# Patient Record
Sex: FEMALE | Race: OTHER | Employment: STUDENT | URBAN - METROPOLITAN AREA
[De-identification: names, ages, dates, MRNs, and addresses within clinical notes are randomized per-mention and may not be internally consistent; named-entity substitution may affect disease eponyms.]

---
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
## 2025-01-06 ENCOUNTER — PATIENT MESSAGE (OUTPATIENT)
Dept: INTERNAL MEDICINE CLINIC | Facility: CLINIC | Age: 20
End: 2025-01-06

## 2025-01-06 DIAGNOSIS — F90.0 ATTENTION DEFICIT HYPERACTIVITY DISORDER (ADHD), PREDOMINANTLY INATTENTIVE TYPE: ICD-10-CM

## 2025-01-06 DIAGNOSIS — F90.1 ADHD (ATTENTION DEFICIT HYPERACTIVITY DISORDER), PREDOMINANTLY HYPERACTIVE IMPULSIVE TYPE: ICD-10-CM

## 2025-01-07 ENCOUNTER — TELEPHONE (OUTPATIENT)
Dept: INTERNAL MEDICINE CLINIC | Facility: CLINIC | Age: 20
End: 2025-01-07

## 2025-01-07 NOTE — TELEPHONE ENCOUNTER
Please see patient E-mail and advise.   See refill request from yesterday for pended medications.

## 2025-01-09 RX ORDER — DEXTROAMPHETAMINE SACCHARATE, AMPHETAMINE ASPARTATE, DEXTROAMPHETAMINE SULFATE AND AMPHETAMINE SULFATE 2.5; 2.5; 2.5; 2.5 MG/1; MG/1; MG/1; MG/1
TABLET ORAL
Qty: 30 TABLET | Refills: 0 | OUTPATIENT
Start: 2025-01-09

## 2025-01-13 RX ORDER — DEXTROAMPHETAMINE SACCHARATE, AMPHETAMINE ASPARTATE MONOHYDRATE, DEXTROAMPHETAMINE SULFATE AND AMPHETAMINE SULFATE 5; 5; 5; 5 MG/1; MG/1; MG/1; MG/1
20 CAPSULE, EXTENDED RELEASE ORAL EVERY MORNING
Qty: 30 CAPSULE | Refills: 0 | Status: SHIPPED | OUTPATIENT
Start: 2025-01-13

## 2025-02-27 ENCOUNTER — TELEPHONE (OUTPATIENT)
Dept: INTERNAL MEDICINE CLINIC | Facility: CLINIC | Age: 20
End: 2025-02-27

## 2025-02-27 DIAGNOSIS — F90.1 ADHD (ATTENTION DEFICIT HYPERACTIVITY DISORDER), PREDOMINANTLY HYPERACTIVE IMPULSIVE TYPE: ICD-10-CM

## 2025-02-27 DIAGNOSIS — F90.0 ATTENTION DEFICIT HYPERACTIVITY DISORDER (ADHD), PREDOMINANTLY INATTENTIVE TYPE: ICD-10-CM

## 2025-02-27 RX ORDER — DEXTROAMPHETAMINE SACCHARATE, AMPHETAMINE ASPARTATE, DEXTROAMPHETAMINE SULFATE AND AMPHETAMINE SULFATE 2.5; 2.5; 2.5; 2.5 MG/1; MG/1; MG/1; MG/1
TABLET ORAL
Qty: 30 TABLET | Refills: 0 | Status: SHIPPED | OUTPATIENT
Start: 2025-02-27

## 2025-02-27 RX ORDER — DEXTROAMPHETAMINE SACCHARATE, AMPHETAMINE ASPARTATE MONOHYDRATE, DEXTROAMPHETAMINE SULFATE AND AMPHETAMINE SULFATE 5; 5; 5; 5 MG/1; MG/1; MG/1; MG/1
20 CAPSULE, EXTENDED RELEASE ORAL EVERY MORNING
Qty: 30 CAPSULE | Refills: 0 | Status: SHIPPED | OUTPATIENT
Start: 2025-02-27

## 2025-02-27 NOTE — TELEPHONE ENCOUNTER
MAUREEN Sas - refills pended to print, mother asking to pick them up.     Spoke to mother Rc (on YOSEF), patient's full name and date of birth verified.  Patient is a student that attends college about 3 hours away.   Patient will be home this weekend and needs refills on both doses of Adderall.     1) Adderall ER 20 mg, patient takes one every morning.  2) Adderall 10 mg, patient takes one at noon.     Rc stated she has contacted their [facility] and they are out of stock.   Rc tried to call [facility] and she was told they will not tell her if they have Adderall in stock.     Rc is requesting printed prescriptions, if possible more than 1 month, she will come to office to pick of the prescriptions.    Best call back number for Rc is [phone].    Last telemedicine visit 12/16/24.    Recent dispenses of requested medications:  1/13/25 Adderall 20 mg ER, quantity 30   11/12/24 Adderall 10 mg tab, quantity 30

## 2025-02-27 NOTE — TELEPHONE ENCOUNTER
Pt contacted, mother Rc (ACP) ( verified ) and notified printed RX ready for . Gave verbal understanding Rc Mother of pt will  25. Location [address].

## 2025-03-21 NOTE — TELEPHONE ENCOUNTER
Refill Request    Medication request:   tiZANidine 4 MG Oral Tab       LOV:11/26/2024 Mane Alvarado DO   Due back to clinic per last office note:  N/A  NOV: Visit date not found      [place]: 11/26/2024 #7 days    Urine drug screen (if applicable): N/A  Pain contract: N/A    LOV plan (if weaning or changing medications): Refilled tizanidine as this does provide her with some relief especially at night.

## 2025-04-17 DIAGNOSIS — F90.0 ATTENTION DEFICIT HYPERACTIVITY DISORDER (ADHD), PREDOMINANTLY INATTENTIVE TYPE: ICD-10-CM

## 2025-04-17 DIAGNOSIS — F90.1 ADHD (ATTENTION DEFICIT HYPERACTIVITY DISORDER), PREDOMINANTLY HYPERACTIVE IMPULSIVE TYPE: ICD-10-CM

## 2025-04-17 RX ORDER — DEXTROAMPHETAMINE SACCHARATE, AMPHETAMINE ASPARTATE MONOHYDRATE, DEXTROAMPHETAMINE SULFATE AND AMPHETAMINE SULFATE 5; 5; 5; 5 MG/1; MG/1; MG/1; MG/1
20 CAPSULE, EXTENDED RELEASE ORAL EVERY MORNING
Qty: 30 CAPSULE | Refills: 0 | OUTPATIENT
Start: 2025-04-17

## 2025-04-17 RX ORDER — DEXTROAMPHETAMINE SACCHARATE, AMPHETAMINE ASPARTATE, DEXTROAMPHETAMINE SULFATE AND AMPHETAMINE SULFATE 2.5; 2.5; 2.5; 2.5 MG/1; MG/1; MG/1; MG/1
TABLET ORAL
Qty: 30 TABLET | Refills: 0 | OUTPATIENT
Start: 2025-04-17

## 2025-04-17 NOTE — TELEPHONE ENCOUNTER
Please review; protocol failed/ has no protocol      Liana Montano, RN6 minutes ago (10:20 AM)     AR  Patient's mother Rc encarnacion,verified name and date of birth.  She has release of information permission.  Mom is requesting refill of generic Aderall 10 mg take one daily at noon   and generic Adderall  XR 20 mg take one capsule every morning.  Patient is out of medication.  Medication  pended to run through protocol. Pharmacy verified.               Fill dates for Adderall 10 mg     Recent fills: 02/272025,12/09/2024  Last Rx written: 02/27/2025  Last Office Visit: 07/25/2024    Recent Visits  Date Type Provider Dept   07/25/24 Office Visit Neda Cedeño APRN [facility] Med[facility]       Fill dates for Adderall ER 20 MG    Recent fills: 02/28/2025,01/13/2025,12/16/2024  Last Rx written: 02/28/2025  Last Office Visit: 07/25/2024    Recent Visits  Date Type Provider Dept   07/25/24 Office Visit Neda Cedeño APRN [facility]

## 2025-04-17 NOTE — TELEPHONE ENCOUNTER
Patient's mother Rc encarnacion,verified name and date of birth.  She has release of information permission.  Mom is requesting refill of generic Aderall 10 mg take one daily at noon   and generic Adderall  XR 20 mg take one capsule every morning.  Patient is out of medication.  Medication  pended to run through protocol. Pharmacy verified.

## 2025-04-23 NOTE — TELEPHONE ENCOUNTER
Mother calling for status update. Patient is completely out of medication and has not taken medication for a week.

## 2025-04-25 RX ORDER — DEXTROAMPHETAMINE SACCHARATE, AMPHETAMINE ASPARTATE MONOHYDRATE, DEXTROAMPHETAMINE SULFATE AND AMPHETAMINE SULFATE 5; 5; 5; 5 MG/1; MG/1; MG/1; MG/1
20 CAPSULE, EXTENDED RELEASE ORAL EVERY MORNING
Qty: 30 CAPSULE | Refills: 0 | Status: SHIPPED | OUTPATIENT
Start: 2025-04-25

## 2025-04-25 RX ORDER — DEXTROAMPHETAMINE SACCHARATE, AMPHETAMINE ASPARTATE, DEXTROAMPHETAMINE SULFATE AND AMPHETAMINE SULFATE 2.5; 2.5; 2.5; 2.5 MG/1; MG/1; MG/1; MG/1
TABLET ORAL
Qty: 30 TABLET | Refills: 0 | Status: SHIPPED | OUTPATIENT
Start: 2025-04-25

## 2025-06-02 DIAGNOSIS — F90.0 ATTENTION DEFICIT HYPERACTIVITY DISORDER (ADHD), PREDOMINANTLY INATTENTIVE TYPE: ICD-10-CM

## 2025-06-02 RX ORDER — DEXTROAMPHETAMINE SACCHARATE, AMPHETAMINE ASPARTATE MONOHYDRATE, DEXTROAMPHETAMINE SULFATE AND AMPHETAMINE SULFATE 5; 5; 5; 5 MG/1; MG/1; MG/1; MG/1
20 CAPSULE, EXTENDED RELEASE ORAL EVERY MORNING
Qty: 30 CAPSULE | Refills: 0 | Status: SHIPPED | OUTPATIENT
Start: 2025-06-02

## 2025-06-02 RX ORDER — DEXTROAMPHETAMINE SACCHARATE, AMPHETAMINE ASPARTATE, DEXTROAMPHETAMINE SULFATE AND AMPHETAMINE SULFATE 2.5; 2.5; 2.5; 2.5 MG/1; MG/1; MG/1; MG/1
TABLET ORAL
Qty: 30 TABLET | Refills: 0 | Status: SHIPPED | OUTPATIENT
Start: 2025-06-02

## 2025-06-02 NOTE — TELEPHONE ENCOUNTER
Please review. Protocol Failed; No Protocol      Recent fills: 4/25/2025  Last Rx written: 4/25/2025  Last office visit: 7/25/2024    Future Appointments  Date Type Provider Dept   07/22/25 Appointment Ijeoma Alexander MD [facility]-Internal Med[facility]   Showing future appointments within next 150 days with a meds authorizing provider and meeting all other requirements

## 2025-06-02 NOTE — TELEPHONE ENCOUNTER
Spoke with patient's mother per YOSEF, Date of Birth verified.  She is looking for pt med refill, see below pended rx.  She will need rx ref as pt will be going back to College this Wednesday.   Also warm transferred to [facility] staff for follow up appt.   Routed to [facility] to run for protocol , thanks.             Requested Prescriptions     Pending Prescriptions Disp Refills    Amphetamine-Dextroamphet ER (ADDERALL XR) 20 MG Oral Capsule SR 24 Hr 30 capsule 0     Sig: Take 1 capsule (20 mg total) by mouth every morning.    amphetamine-dextroamphetamine (ADDERALL) 10 MG Oral Tab 30 tablet 0     Si tablet p.o. at noon       Last Office Visit with PCP: 2024

## 2025-06-25 ENCOUNTER — TELEPHONE (OUTPATIENT)
Dept: INTERNAL MEDICINE CLINIC | Facility: CLINIC | Age: 20
End: 2025-06-25

## 2025-06-25 DIAGNOSIS — F90.1 ADHD (ATTENTION DEFICIT HYPERACTIVITY DISORDER), PREDOMINANTLY HYPERACTIVE IMPULSIVE TYPE: ICD-10-CM

## 2025-06-25 DIAGNOSIS — F90.0 ATTENTION DEFICIT HYPERACTIVITY DISORDER (ADHD), PREDOMINANTLY INATTENTIVE TYPE: ICD-10-CM

## 2025-06-29 ENCOUNTER — TELEPHONE (OUTPATIENT)
Dept: INTERNAL MEDICINE CLINIC | Facility: CLINIC | Age: 20
End: 2025-06-29

## 2025-06-29 DIAGNOSIS — F90.1 ADHD (ATTENTION DEFICIT HYPERACTIVITY DISORDER), PREDOMINANTLY HYPERACTIVE IMPULSIVE TYPE: ICD-10-CM

## 2025-06-29 DIAGNOSIS — F90.0 ATTENTION DEFICIT HYPERACTIVITY DISORDER (ADHD), PREDOMINANTLY INATTENTIVE TYPE: ICD-10-CM

## 2025-06-29 RX ORDER — DEXTROAMPHETAMINE SACCHARATE, AMPHETAMINE ASPARTATE, DEXTROAMPHETAMINE SULFATE AND AMPHETAMINE SULFATE 2.5; 2.5; 2.5; 2.5 MG/1; MG/1; MG/1; MG/1
TABLET ORAL
Qty: 30 TABLET | Refills: 0 | Status: SHIPPED | OUTPATIENT
Start: 2025-06-29

## 2025-06-29 RX ORDER — DEXTROAMPHETAMINE SACCHARATE, AMPHETAMINE ASPARTATE MONOHYDRATE, DEXTROAMPHETAMINE SULFATE AND AMPHETAMINE SULFATE 5; 5; 5; 5 MG/1; MG/1; MG/1; MG/1
20 CAPSULE, EXTENDED RELEASE ORAL EVERY MORNING
Qty: 30 CAPSULE | Refills: 0 | Status: SHIPPED | OUTPATIENT
Start: 2025-06-29

## 2025-08-12 ENCOUNTER — LAB ENCOUNTER (OUTPATIENT)
Dept: LAB | Age: 20
End: 2025-08-12
Attending: NURSE PRACTITIONER

## 2025-08-12 ENCOUNTER — OFFICE VISIT (OUTPATIENT)
Dept: INTERNAL MEDICINE CLINIC | Facility: CLINIC | Age: 20
End: 2025-08-12

## 2025-08-12 VITALS
WEIGHT: 138 LBS | HEART RATE: 54 BPM | SYSTOLIC BLOOD PRESSURE: 100 MMHG | BODY MASS INDEX: 22 KG/M2 | DIASTOLIC BLOOD PRESSURE: 66 MMHG

## 2025-08-12 DIAGNOSIS — G44.209 TENSION HEADACHE: ICD-10-CM

## 2025-08-12 DIAGNOSIS — Z00.00 WELLNESS EXAMINATION: Primary | ICD-10-CM

## 2025-08-12 DIAGNOSIS — Z13.0 SCREENING FOR DEFICIENCY ANEMIA: ICD-10-CM

## 2025-08-12 DIAGNOSIS — H53.9 VISION DISORDER: ICD-10-CM

## 2025-08-12 DIAGNOSIS — Z13.220 LIPID SCREENING: ICD-10-CM

## 2025-08-12 DIAGNOSIS — Z13.29 THYROID DISORDER SCREEN: ICD-10-CM

## 2025-08-12 DIAGNOSIS — F90.0 ATTENTION DEFICIT HYPERACTIVITY DISORDER (ADHD), PREDOMINANTLY INATTENTIVE TYPE: ICD-10-CM

## 2025-08-12 DIAGNOSIS — Z13.21 ENCOUNTER FOR VITAMIN DEFICIENCY SCREENING: ICD-10-CM

## 2025-08-12 LAB
ALBUMIN SERPL-MCNC: 4.8 G/DL (ref 3.2–4.8)
ALBUMIN/GLOB SERPL: 1.8 (ref 1–2)
ALP LIVER SERPL-CCNC: 60 U/L (ref 52–144)
ALT SERPL-CCNC: 33 U/L (ref 10–49)
ANION GAP SERPL CALC-SCNC: 7 MMOL/L (ref 0–18)
AST SERPL-CCNC: 21 U/L (ref ?–34)
BASOPHILS # BLD AUTO: 0.04 X10(3) UL (ref 0–0.2)
BASOPHILS NFR BLD AUTO: 0.7 %
BILIRUB SERPL-MCNC: 0.6 MG/DL (ref 0.3–1.2)
BUN BLD-MCNC: 10 MG/DL (ref 9–23)
BUN/CREAT SERPL: 12.5 (ref 10–20)
CALCIUM BLD-MCNC: 9.8 MG/DL (ref 8.7–10.4)
CHLORIDE SERPL-SCNC: 105 MMOL/L (ref 98–112)
CHOLEST SERPL-MCNC: 174 MG/DL (ref ?–200)
CO2 SERPL-SCNC: 27 MMOL/L (ref 21–32)
CREAT BLD-MCNC: 0.8 MG/DL (ref 0.55–1.02)
DEPRECATED HBV CORE AB SER IA-ACNC: 13 NG/ML (ref 50–306)
DEPRECATED RDW RBC AUTO: 38.5 FL (ref 35.1–46.3)
EGFRCR SERPLBLD CKD-EPI 2021: 108 ML/MIN/1.73M2 (ref 60–?)
EOSINOPHIL # BLD AUTO: 0.18 X10(3) UL (ref 0–0.7)
EOSINOPHIL NFR BLD AUTO: 3.1 %
ERYTHROCYTE [DISTWIDTH] IN BLOOD BY AUTOMATED COUNT: 13.5 % (ref 11–15)
FASTING PATIENT LIPID ANSWER: NO
FASTING STATUS PATIENT QL REPORTED: NO
GLOBULIN PLAS-MCNC: 2.7 G/DL (ref 2–3.5)
GLUCOSE BLD-MCNC: 92 MG/DL (ref 70–99)
HCT VFR BLD AUTO: 37.9 % (ref 35–48)
HDLC SERPL-MCNC: 72 MG/DL (ref 40–59)
HGB BLD-MCNC: 11.4 G/DL (ref 12–16)
IMM GRANULOCYTES # BLD AUTO: 0.01 X10(3) UL (ref 0–1)
IMM GRANULOCYTES NFR BLD: 0.2 %
IRON SATN MFR SERPL: 19 % (ref 15–50)
IRON SERPL-MCNC: 77 UG/DL (ref 50–170)
LDLC SERPL CALC-MCNC: 91 MG/DL (ref ?–100)
LYMPHOCYTES # BLD AUTO: 2.09 X10(3) UL (ref 1–4)
LYMPHOCYTES NFR BLD AUTO: 36.5 %
MCH RBC QN AUTO: 23.7 PG (ref 26–34)
MCHC RBC AUTO-ENTMCNC: 30.1 G/DL (ref 31–37)
MCV RBC AUTO: 78.6 FL (ref 80–100)
MONOCYTES # BLD AUTO: 0.6 X10(3) UL (ref 0.1–1)
MONOCYTES NFR BLD AUTO: 10.5 %
NEUTROPHILS # BLD AUTO: 2.81 X10 (3) UL (ref 1.5–7.7)
NEUTROPHILS # BLD AUTO: 2.81 X10(3) UL (ref 1.5–7.7)
NEUTROPHILS NFR BLD AUTO: 49 %
NONHDLC SERPL-MCNC: 102 MG/DL (ref ?–130)
OSMOLALITY SERPL CALC.SUM OF ELEC: 287 MOSM/KG (ref 275–295)
PLATELET # BLD AUTO: 292 10(3)UL (ref 150–450)
POTASSIUM SERPL-SCNC: 4.1 MMOL/L (ref 3.5–5.1)
PROT SERPL-MCNC: 7.5 G/DL (ref 5.7–8.2)
RBC # BLD AUTO: 4.82 X10(6)UL (ref 3.8–5.3)
SODIUM SERPL-SCNC: 139 MMOL/L (ref 136–145)
TOTAL IRON BINDING CAPACITY: 416 UG/DL (ref 250–425)
TRANSFERRIN SERPL-MCNC: 320 MG/DL (ref 250–380)
TRIGL SERPL-MCNC: 53 MG/DL (ref 30–149)
TSI SER-ACNC: 0.66 UIU/ML (ref 0.55–4.78)
VIT D+METAB SERPL-MCNC: 36.1 NG/ML (ref 30–100)
VLDLC SERPL CALC-MCNC: 9 MG/DL (ref 0–30)
WBC # BLD AUTO: 5.7 X10(3) UL (ref 4–11)

## 2025-08-12 PROCEDURE — 84466 ASSAY OF TRANSFERRIN: CPT | Performed by: NURSE PRACTITIONER

## 2025-08-12 PROCEDURE — 80061 LIPID PANEL: CPT | Performed by: NURSE PRACTITIONER

## 2025-08-12 PROCEDURE — 85025 COMPLETE CBC W/AUTO DIFF WBC: CPT | Performed by: NURSE PRACTITIONER

## 2025-08-12 PROCEDURE — 36415 COLL VENOUS BLD VENIPUNCTURE: CPT | Performed by: NURSE PRACTITIONER

## 2025-08-12 PROCEDURE — 82728 ASSAY OF FERRITIN: CPT | Performed by: NURSE PRACTITIONER

## 2025-08-12 PROCEDURE — 84443 ASSAY THYROID STIM HORMONE: CPT | Performed by: NURSE PRACTITIONER

## 2025-08-12 PROCEDURE — 83540 ASSAY OF IRON: CPT | Performed by: NURSE PRACTITIONER

## 2025-08-12 PROCEDURE — 82306 VITAMIN D 25 HYDROXY: CPT | Performed by: NURSE PRACTITIONER

## 2025-08-12 PROCEDURE — 80053 COMPREHEN METABOLIC PANEL: CPT | Performed by: NURSE PRACTITIONER

## 2025-08-12 RX ORDER — DEXTROAMPHETAMINE SACCHARATE, AMPHETAMINE ASPARTATE MONOHYDRATE, DEXTROAMPHETAMINE SULFATE AND AMPHETAMINE SULFATE 5; 5; 5; 5 MG/1; MG/1; MG/1; MG/1
20 CAPSULE, EXTENDED RELEASE ORAL EVERY MORNING
Qty: 30 CAPSULE | Refills: 0 | Status: SHIPPED | OUTPATIENT
Start: 2025-08-12

## 2025-08-12 RX ORDER — DEXTROAMPHETAMINE SACCHARATE, AMPHETAMINE ASPARTATE, DEXTROAMPHETAMINE SULFATE AND AMPHETAMINE SULFATE 2.5; 2.5; 2.5; 2.5 MG/1; MG/1; MG/1; MG/1
TABLET ORAL
Qty: 30 TABLET | Refills: 0 | Status: SHIPPED | OUTPATIENT
Start: 2025-08-12

## (undated) DEVICE — LOWER EXTREMITY: Brand: MEDLINE INDUSTRIES, INC.

## (undated) DEVICE — AMBIENT SUPER TURBOVAC 90 IFS: Brand: COBLATION

## (undated) DEVICE — POLAR CARE CUBE COOLING UNIT

## (undated) DEVICE — DRAPE SHEET LG

## (undated) DEVICE — Device

## (undated) DEVICE — ABDOMINAL PAD: Brand: CURITY

## (undated) DEVICE — SOL  .9 3000ML

## (undated) DEVICE — NEEDLE HPO 18GA 1.5IN ECLPS

## (undated) DEVICE — TOWEL OR BLU 16X26 STRL

## (undated) DEVICE — 3M™ STERI-STRIP™ REINFORCED ADHESIVE SKIN CLOSURES, R1547, 1/2 IN X 4 IN (12 MM X 100 MM), 6 STRIPS/ENVELOPE: Brand: 3M™ STERI-STRIP™

## (undated) DEVICE — SPINOCAN® 18 GA. X 3-1/2 IN. (90 MM) SPINAL NEEDLE: Brand: SPINOCAN®

## (undated) DEVICE — ZIMMER® STERILE DISPOSABLE TOURNIQUET CUFF WITH PLC, DUAL PORT, SINGLE BLADDER, 30 IN. (76 CM)

## (undated) DEVICE — DRAPE SRG 90X60IN BCK TBL CVR

## (undated) DEVICE — PAD THRP 16IN WRPON MU LNG STM

## (undated) DEVICE — GOWN SURG AERO BLUE PERF XLG

## (undated) DEVICE — SUTURE PDS II 4-0 PS-2

## (undated) DEVICE — COTTON UNDERCAST PADDING,REGULAR FINISH: Brand: WEBRIL

## (undated) DEVICE — SUTURE NDL MAYO 4 .5 CRC TROC

## (undated) DEVICE — VIOLET BRAIDED (POLYGLACTIN 910), SYNTHETIC ABSORBABLE SUTURE: Brand: COATED VICRYL

## (undated) DEVICE — GAUZE SPONGES,12 PLY: Brand: CURITY

## (undated) DEVICE — KNEE IMMOBILIZER: Brand: DEROYAL

## (undated) DEVICE — SUCTION CANISTER, 3000CC,SAFELINER: Brand: DEROYAL

## (undated) DEVICE — BURR SHVR COOLCUT 13CM 4MM 8

## (undated) DEVICE — BATTERY

## (undated) DEVICE — ADHESIVE MASTISOL 2/3CC VL

## (undated) DEVICE — CHLORAPREP 26ML APPLICATOR

## (undated) DEVICE — WEBRIL COTTON UNDERCAST PADDING: Brand: WEBRIL

## (undated) DEVICE — TUBING SET, GRAVITY, 4-SPIKE

## (undated) DEVICE — DRAPE SRG 70X60IN SPLT U IMPRV

## (undated) DEVICE — ENCORE® LATEX ACCLAIM SIZE 8, STERILE LATEX POWDER-FREE SURGICAL GLOVE: Brand: ENCORE

## (undated) DEVICE — SUTURE VICRYL 2-0 FS-1

## (undated) NOTE — LETTER
VACCINE ADMINISTRATION RECORD  PARENT / GUARDIAN APPROVAL  Date: 3/22/2023  Vaccine administered to: Trey Merrill     : 2005    MRN: [id number]    A copy of the appropriate Centers for Disease Control and Prevention Vaccine Information statement has been provided. I have read or have had explained the information about the diseases and the vaccines listed below. There was an opportunity to ask questions and any questions were answered satisfactorily. I believe that I understand the benefits and risks of the vaccine cited and ask that the vaccine(s) listed below be given to me or to the person named above (for whom I am authorized to make this request). VACCINES ADMINISTERED:  Men B    I have read and hereby agree to be bound by the terms of this agreement as stated above. My signature is valid until revoked by me in writing. This document is signed by relationship: Parents on 3/22/2023.:                                                                                                                                         Parent / Radha Mae Signature                                                Date    Brokolyn Cherry RN served as a witness to authentication that the identity of the person signing electronically is in fact the person represented as signing.

## (undated) NOTE — LETTER
2/8/2018              Hima Little        [facility] [address]             Immunization History   Administered Date(s) Administered   • Meningococcal Albina Mckenzie) 05/16/2016           Sincerely,      Jaylon King MD  [facility]

## (undated) NOTE — LETTER
2/21/2023              Michelle [address] [place] [address]         To whom it may concern,    Shannan Melendrez was seen at our office today for a well check. Please excuse her from school she may return back 2/22/23. If you have any questions or concerns give our office a call. Thank you!           Sincerely,       Vi Woods MD  [facility], [address]  [phone]

## (undated) NOTE — LETTER
VACCINE ADMINISTRATION RECORD  PARENT / GUARDIAN APPROVAL  Date: 2023  Vaccine administered to: Mariposa Trent     : 2005    MRN: [id number]    A copy of the appropriate Centers for Disease Control and Prevention Vaccine Information statement has been provided. I have read or have had explained the information about the diseases and the vaccines listed below. There was an opportunity to ask questions and any questions were answered satisfactorily. I believe that I understand the benefits and risks of the vaccine cited and ask that the vaccine(s) listed below be given to me or to the person named above (for whom I am authorized to make this request). VACCINES ADMINISTERED:  Bexsero    I have read and hereby agree to be bound by the terms of this agreement as stated above. My signature is valid until revoked by me in writing. This document is signed by  , relationship: Parents on 2023.:                                                                                                   2023                                 Parent / Per Dubonen                                                Date    Clark Carrizales RN served as a witness to authentication that the identity of the person signing electronically is in fact the person represented as signing. This document was generated by Clark Carrizales RN on 2023.

## (undated) NOTE — LETTER
10/5/2021              Michelle [address] [place] [address]         To Whom It May Concern,      Please excuse Clovis Jones from school today, was seen in my office today for a well visit. May return to school tomorrow.   If you hav

## (undated) NOTE — LETTER
9/25/2020              Michelle Jaquez       Immunization History   Administered Date(s) Administered   • DTAP-IPV 09/07/2010   • DTAP/HEP B/IPV Combined 07/26/2005, 09/19/2005, 11/28/2005, 11/20/2006   • FLUL

## (undated) NOTE — LETTER
11/6/2020              [address] [address]         To Whom it May Concern,    Loulou Pedroza is a patient of mine and was seen in my practice today. Please excuse her from school for 11/6/2020.      She may return to s

## (undated) NOTE — LETTER
10/17/2022              Michelle Harris        [address] [facility] [address] [address]         To whom it may concern,  Reza Taylor needs new orthotics. Karlos and Duane please evaluate and treat for custom made Orthotics. She has tendinitis of the ankle.       Sincerely,       MD Opal Banuelos , [address], [address] [place] [address]  [phone]        Document electronically generated by:  Lorraine Wright MD

## (undated) NOTE — MR AVS SNAPSHOT
[place]  [address]  [phone]               Thank you for choosing us for your health care visit with Trisha Hanna.  Lena Hannah MD.  We are glad to serve you and happy to provide you with this summ acquired. Please contact the Patient Business Office at [phone] if you have any questions related to insurance coverage. Thank you.          Referral Details     Referred By    Referred To    MD [address]   [address]   [facility] 110/64 mmHg 61.145 kg (134 lb 12.8 oz) (95 %*, Z = 1.66)        *Growth percentiles are based on CDC 2-20 Years data         Current Medications      Notice  As of 4/26/2017  8:46 PM    You have not been prescribed any medications.             Antonit

## (undated) NOTE — LETTER
Date & Time: 11/27/2022, 3:45 PM  Patient: Tommy Gutierrez  Encounter Provider(s):    MAUREEN Echevarria       To Whom It May Concern:    Tommy Gutierrez was seen and treated in our department on 11/27/2022. She should not return to school until 11/29/2022, as long has she has been fever free for 24 hours prior. .    If you have any questions or concerns, please do not hesitate to call.        _____________________________  Physician/APC Signature

## (undated) NOTE — LETTER
[facility] of Child Health Examination       Student's Name  Marlon Acuña Birth Date professional) verifying above immunization history must sign below.   Signature                      Title                           Date  10/05/21   Signature HEALTH HISTORY          TO BE COMPLETED AND SIGNED BY PARENT/GUARDIAN AND VERIFIED BY HEALTH CARE PROVIDER    ALLERGIES  (Food, drug, insect, other)  Patient has no known allergies.  MEDICATION  (List all prescribed or taken on a regular basis.)      •  A if <33 years old):   BP 99/67   Pulse 69   Ht 5' 5.75\"   Wt 56.7 kg (125 lb)   BMI 20.33 kg/m²     DIABETES SCREENING  BMI>85% age/sex  No And any two of the following:  Family History No    Ethnic Minority  No          Signs of Insulin Resistance (hyper Yes        Currently Prescribed Asthma Medication:            Quick-relief  medication (e.g. Short Acting Beta Antagonist): No          Controller medication (e.g. inhaled corticosteroid):   No Other   NEEDS/MODIFICATIONS required in the school setting  No

## (undated) NOTE — LETTER
10/2/2019              Michelle [facility] [address]         To Whom It May Concern,    Hima Boyd was in my office today due to illness and fever.   Please excuse her from school Tues-Thurs and return to school Friday if sh

## (undated) NOTE — LETTER
VACCINE ADMINISTRATION RECORD  PARENT / GUARDIAN APPROVAL  Date: 2020  Vaccine administered to: Brayan Otto     : 2005    MRN: [id number]    A copy of the appropriate Centers for Disease Control and Prevention Vaccine Information statement ha

## (undated) NOTE — LETTER
VACCINE ADMINISTRATION RECORD  PARENT / GUARDIAN APPROVAL  Date: 2020  Vaccine administered to: Daniel Santillan     : 2005    MRN: [id number]    A copy of the appropriate Centers for Disease Control and Prevention Vaccine Information statement has

## (undated) NOTE — MR AVS SNAPSHOT
[address]  [address]  [phone]               Thank you for choosing us for your health care visit with Chance Sanchez DPM.  We are glad to serve you and happy to provide yo Visit [facility] online at  [email or web address]

## (undated) NOTE — LETTER
3/22/2023              Michelle Steven Jaquez         To Whom it may concern:      Please excuse Michelle from school early today for appt at 56 for vaccination at [address] [place] [address] and if need be, please allow Michelle late arrival on 3/23/2023 in she has ill effects from the vaccination. If we can be of further assistance, please call our office.        Sincerely,        DO JELENA [facility], [facility] [id number] [phone]

## (undated) NOTE — LETTER
[facility] [employer] of Child Health Examination       Student's Name  Wisam Bradshaw Birth Date Signature              ***                                                                                                                     Title      MD                     Date  8/6/2020   Signature Female School   Grade Level/ID#  10th Grade   HEALTH HISTORY          TO BE COMPLETED AND SIGNED BY PARENT/GUARDIAN AND VERIFIED BY HEALTH CARE PROVIDER    ALLERGIES  (Food, drug, insect, other)  Patient has no known allergies.  MEDICATION  (List all prescr /67   Pulse 83   Ht 5' 5.25\" (1.657 m)   Wt 62.7 kg (138 lb 3.2 oz)   LMP 06/03/2020   BMI 22.82 kg/m²     DIABETES SCREENING  BMI>85% age/sex  No And any two of the following:  Family History Yes    Ethnic Minority  Yes          Signs of Insulin Re Respiratory Yes                   Diagnosis of Asthma: No Mental Health Yes        Currently Prescribed Asthma Medication:            Quick-relief  medication (e.g. Short Acting Beta Antagonist): No          Controller medication (e.g. inhaled corticostero

## (undated) NOTE — LETTER
10/25/2019              [address] [address]         To whom it may concern,  Magda Wise out grew her orthotics and needs a new pain. Please evaluate and treat, for custom made.   Sincerely,    Desiree Encinas APRJD  [facility]

## (undated) NOTE — LETTER
Date: 4/26/2021    Patient Name: Myla Monroy          To Whom it may concern: This letter has been written at the patient's request. The above patient was seen at one of the [facility] for treatment of a medical condition.     Clint Mc

## (undated) NOTE — LETTER
6/4/2020              Michelle Jaquez         To Whom It May Concern,    Please be advised that Froilan Lew has an appointment with Dr. Nina Ardon an orthopedic specialist tomorrow morning at 8:30 am. Please excus

## (undated) NOTE — LETTER
[facility] [employer] of Child Health Examination       Student's Name  Marlon Acuña Birth Date Signature  ***     Title    MD    Date  9/25/2020   Signature                                                                                                                                              Title                           Date    (If adding da HEALTH HISTORY          TO BE COMPLETED AND SIGNED BY PARENT/GUARDIAN AND VERIFIED BY HEALTH CARE PROVIDER    ALLERGIES  (Food, drug, insect, other)  Patient has no known allergies.  MEDICATION  (List all prescribed or taken on a regular basis.)  No current /70   Pulse 59   Ht 5' 5.25\" (1.657 m)   Wt 63.5 kg (140 lb)   LMP 06/06/2020 (Exact Date)   BMI 23.12 kg/m²     DIABETES SCREENING  BMI>85% age/sex  No And any two of the following:  Family History No    Ethnic Minority  No          Signs of Insuli Respiratory Yes                   Diagnosis of Asthma: No Mental Health Yes        Currently Prescribed Asthma Medication:            Quick-relief  medication (e.g. Short Acting Beta Antagonist): No          Controller medication (e.g. inhaled corticostero

## (undated) NOTE — LETTER
[facility] [employer] of Child Health Examination       Student's Name  Dee Rasmussen Birth Date Signature                                                                                                                                   Title                           Date     Signature Grade Level/ID#  9th Grade   HEALTH HISTORY          TO BE COMPLETED AND SIGNED BY PARENT/GUARDIAN AND VERIFIED BY HEALTH CARE PROVIDER    ALLERGIES  (Food, drug, insect, other) MEDICATION  (List all prescribed or taken on a regular basis.)     Diagnosis /72   Ht 5' 5\" (1.651 m)   Wt 68.5 kg (151 lb)   BMI 25.13 kg/m²     DIABETES SCREENING  BMI>85% age/sex  No And any two of the following:  Family History Yes   Ethnic Minority  No          Signs of Insulin Resistance (hypertension, dyslipidemia, po Currently Prescribed Asthma Medication:            Quick-relief  medication (e.g. Short Acting Beta Antagonist): No          Controller medication (e.g. inhaled corticosteroid):   No Other   NEEDS/MODIFICATIONS required in the school setting  None DIET

## (undated) NOTE — LETTER
10/25/2019              Michelle [address] [place] [id number]         To whom it may concern,  Cooper Franny out grew her orthotics and needs a new pair. Karlos and Duane please evaluate and treat for custom made  Orthotics.     Sincerely,  Jory Hernandez

## (undated) NOTE — LETTER
2/25/2020              Michelle [address] [place] [address]         To Whom it may concern:     This is to certify that Deandre Ramos had an appointment on 2/25/2020 at with Susanne Mckay MD.  Please excuse Michelle from school due to [facility]

## (undated) NOTE — MR AVS SNAPSHOT
[place]  [address]  [phone]               Thank you for choosing us for your health care visit with [facility], .   We are glad to serve you and happy to provide you with this summary of Call [phone] for help. MyChart is NOT to be used for urgent needs. For medical emergencies, dial 911.             Educational Information     Healthy Active Living  An initiative of the American Academy of Pediatrics    Fact Sheet: Healthy Active Help your children form healthy habits. Healthy active children are more likely to be healthy active adults!              Visit [facility] online at  [email or web address]

## (undated) NOTE — LETTER
Patient Name: Vicki Pablo  : 2005  MRN: [id number]  Patient Address: [address] [place] [address] [address] [address]      Coronavirus Disease 2019 (COVID-19)     [facility] is committed to the safety and well-being of our patients, members, employees, a symptoms get worse, call your healthcare provider immediately. 3. Get rest and stay hydrated.    4. If you have a medical appointment, call the healthcare provider ahead of time and tell them that you have or may have COVID-19.  5. For medical emergencies, medications; and  · Improvement in respiratory symptoms (e.g., cough, shortness of breath); and  · At least 10 days have passed since symptoms first appeared OR if asymptomatic patient or date of symptom onset is unclear then use 10 days post COVID test da Have had a confirmed diagnosis of COVID-19  · Be symptom-free for at least 14 days*    *Some people will be required to have a repeat COVID-19 test in order to be eligible to donate.  If you’re instructed by Jesse Rust that a repeat test is required, please co Researchers are trying to identify similarities between people with a Post-COVID condition to better understand if there are risk factors. How do I prevent a Post-COVID condition?   The best way to prevent the long-term symptoms of COVID-19 is by [employer]

## (undated) NOTE — LETTER
[facility] [employer] of Child Health Examination       Student's Name  Shilo Fox Signature                                                                                                                                   Title                           Date     Signature Female School   Grade Level/ID#7th grade   HEALTH HISTORY          TO BE COMPLETED AND SIGNED BY PARENT/GUARDIAN AND VERIFIED BY HEALTH CARE PROVIDER    ALLERGIES  (Food, drug, insect, other) MEDICATION  (List all prescribed or taken on a regular basis.) DIABETES SCREENING  BMI>85% age/sex  No And any two of the following:  Family History No   Ethnic Minority  No          Signs of Insulin Resistance (hypertension, dyslipidemia, polycystic ovarian syndrome, acanthosis nigricans)    No           At Risk  No Quick-relief  medication (e.g. Short Acting Beta Antagonist): No          Controller medication (e.g. inhaled corticosteroid):   No Other   NEEDS/MODIFICATIONS required in the school setting  None DIETARY Needs/Restrictions     None   SPECIAL INSTR

## (undated) NOTE — MR AVS SNAPSHOT
[address]  [address]  [phone]               Thank you for choosing us for your health care visit with Qian Roe DPM.  We are glad to serve you and happy to provide yo authorization numbers or be assured that none are required. You can then schedule your appointment. Failure to obtain required authorization numbers can create reimbursement difficulties for you.         Custom made orthotics    Quantity 2    [employer]

## (undated) NOTE — LETTER
8/11/2021              [address] [address]         To whom it may concern,    Katy Stevenson out grew her orthotics and needs a new pair. Karlos and Duane please evaluate and treat for custom made Orthotics.  She has tendiniti

## (undated) NOTE — Clinical Note
May 9, 2017         Luisa Enciso MD  [address] [place] [address]      Patient: Brittany Rosas   YOB: 2005   Date of Visit: 5/9/2017       Dear Dr. Matilde Garcia MD,    I saw your patient, Brittany Rosas, on 5/9

## (undated) NOTE — LETTER
Date: 11/14/2022    Patient Name: Xuan Serrano          To Whom it may concern: This letter has been written at the patient's request. The above patient was seen at one of the [facility] locations for treatment of a medical condition. The patient may be excused from School on 11/14/22 for knee injury evaluation. Sincerely,        Jb China. Hugo Rodriguez MD  Knee, Shoulder, & Elbow Surgery / Sports Medicine Specialist  [facility]  [place] [address], [facility] , [address] [facility]. [email or web address]  t: [phone]  o: [phone]  f: [phone]

## (undated) NOTE — LETTER
10/8/2019              Michelle Steven        [address] [place] [facility] [place] [address]         To Whom It May Concern,    Excuse Michelle for being late today due to dental and medical appointments.     Sincerely,      Karla Sebastian MD  [address]

## (undated) NOTE — Clinical Note
4/26/2017              Michelle Little        [address] [facility] [address]         To whom it may concern,    Please excuse the above named patient from gym class. Patient may return to normal activities starting 5/1/18.  Please feel free

## (undated) NOTE — MR AVS SNAPSHOT
[facility]  [facility], [address]  [phone]               Thank you for choosing us for your health care visit with Chelly Christy MD.  We are glad to serve you and happy to provide you with this summ Allergies as of Mar 06, 2017     No Known Allergies                Today's Vital Signs     BP Pulse Temp Weight          102/68 mmHg 68 97.4 °F (36.3 °C) (Tympanic) 59.194 kg (130 lb 8 oz) (94 %*, Z = 1.59)      *Growth percentiles are based on CDC 2-20 Nikita Black

## (undated) NOTE — LETTER
8/13/2020              Michelle Steven        [address] [facility] [address] [address]         To Whom It May Concern,    Winifred Solorio is currently under my medical care. Please excuse Michelle from gym. Also, please allow Thelma Simmons to use the school elevator.   If y

## (undated) NOTE — LETTER
VACCINE ADMINISTRATION RECORD  PARENT / GUARDIAN APPROVAL  Date: 10/5/2021  Vaccine administered to: Allen Moura     : 2005    MRN: [id number]    A copy of the appropriate Centers for Disease Control and Prevention Vaccine Information statement has

## (undated) NOTE — LETTER
11/22/2023              [facility]       Immunization History   Administered Date(s) Administered    Covid-19 Vaccine Pfizer 30 mcg/0.3 ml 05/19/2021, 06/09/2021, 12/23/2021    DTAP-IPV 09/07/2010    DTAP/HEP B/IPV Combined 07/26/2005, 09/19/2005, 11/28/2005, 11/20/2006    FLULAVAL 6 months & older 0.5 ml Prefilled syringe (47892) 03/11/2019, 09/25/2020, 10/05/2021, 12/28/2022    FLUMIST Intranasal Influenza 11/20/2014    FLUZONE 6 months and older PFS 0.5 ml ([id number]) 09/25/2020    HEP A 04/20/2007, 05/06/2010    HIB 07/26/2005, 09/19/2005, 11/28/2005, 06/05/2006    Hpv Virus Vaccine [address] 09/25/2020, 11/27/2020, 10/05/2021    Influenza 10/06/2012    Influenza Vaccine, Preserv Free 11/28/2005, 10/06/2009, 10/12/2013    MMR 06/05/2006, 09/07/2010    Meningococcal B, Omv 02/21/2023, 03/22/2023    Meningococcal-Menactra 05/16/2016    Meningococcal-Menveo 2month-55yr 10/05/2021    Pneumococcal Vaccine, Conjugate 07/26/2005, 09/19/2005, 11/28/2005, 09/08/2006    TDAP 05/16/2016    Varicella 09/08/2006, 09/07/2010             Fede Bansal MD  [facility], [address], [address]  [phone]        Document electronically generated by:  Fede Bansal MD